# Patient Record
Sex: FEMALE | Race: WHITE | NOT HISPANIC OR LATINO | Employment: FULL TIME | URBAN - METROPOLITAN AREA
[De-identification: names, ages, dates, MRNs, and addresses within clinical notes are randomized per-mention and may not be internally consistent; named-entity substitution may affect disease eponyms.]

---

## 2021-02-10 ENCOUNTER — TELEPHONE (OUTPATIENT)
Dept: URGENT CARE | Facility: CLINIC | Age: 21
End: 2021-02-10

## 2021-03-01 ENCOUNTER — OFFICE VISIT (OUTPATIENT)
Dept: ORTHOPEDIC SURGERY | Facility: CLINIC | Age: 21
End: 2021-03-01

## 2021-03-01 VITALS — BODY MASS INDEX: 24.17 KG/M2 | WEIGHT: 145.06 LBS | HEIGHT: 65 IN

## 2021-03-01 DIAGNOSIS — M25.552 BILATERAL HIP PAIN: Primary | ICD-10-CM

## 2021-03-01 DIAGNOSIS — M25.551 BILATERAL HIP PAIN: Primary | ICD-10-CM

## 2021-03-01 DIAGNOSIS — M54.50 ACUTE MIDLINE LOW BACK PAIN WITHOUT SCIATICA: ICD-10-CM

## 2021-03-01 DIAGNOSIS — M76.31 ILIOTIBIAL BAND SYNDROME OF RIGHT SIDE: ICD-10-CM

## 2021-03-01 DIAGNOSIS — Q65.89 HIP DYSPLASIA, CONGENITAL: ICD-10-CM

## 2021-03-01 PROCEDURE — 99204 OFFICE O/P NEW MOD 45 MIN: CPT | Performed by: ORTHOPAEDIC SURGERY

## 2021-03-01 NOTE — PROGRESS NOTES
"      [facility] Note    Subjective     CC: Pain of the Right Hip and Pain of the Left Hip      HPI    Yenny Liao is a 20 y.o. female who presents with new problem of: bilateral hip pain.  Onset: mechanical fall. The issue has been ongoing for 3 week(s). Pain is a 3/10 on the pain scale. Pain is described as dull. Associated symptoms include pain, popping and stiffness. The pain is worse with leisure; resting and ice improve the pain. Previous treatments have included: NSAIDS.    I have reviewed the following portions of the patient's history:History of Present Illness and review of systems.      She fell 3 weeks ago.  Landed on her tailbone and lateral right hip pain    Review of Systems   Constitutional: Negative.  Negative for chills, fatigue and fever.   HENT: Negative.  Negative for congestion and dental problem.    Eyes: Negative.  Negative for blurred vision.   Respiratory: Negative.  Negative for shortness of breath.    Cardiovascular: Negative.  Negative for leg swelling.   Gastrointestinal: Negative.  Negative for abdominal pain.   Endocrine: Negative.  Negative for polyuria.   Genitourinary: Negative.  Negative for difficulty urinating.   Musculoskeletal: Positive for arthralgias.   Skin: Negative.    Allergic/Immunologic: Negative.    Neurological: Negative.    Hematological: Negative.  Negative for adenopathy.   Psychiatric/Behavioral: Negative.  Negative for behavioral problems.       ROS:    Constiutional:Pt denies fever, chills, nausea, or vomiting.  MSK:as above      Objective      Past Medical History  History reviewed. No pertinent past medical history.      Physical Exam  Ht 165.1 cm (65\")   Wt 65.8 kg (145 lb 1 oz)   LMP 02/01/2021   BMI 24.14 kg/m²     Body mass index is 24.14 kg/m².    Patient is well nourished and well developed.        Ortho Exam  Tender at L5 no radicular pain or symptoms.  Tender right proximal IT band proximal to the greater trochanter   no " pain with hip range of motion    Imaging/Labs/EMG Reviewed:  Imaging Results (Last 24 Hours)     Procedure Component Value Units Date/Time    XR Hips Bilateral With or Without Pelvis 3-4 View [[id number]] Resulted: 03/01/21 0820     Updated: 03/01/21 0828          Assessment:  1. Bilateral hip pain    2. Hip dysplasia, congenital    3. Iliotibial band syndrome of right side    4. Acute midline low back pain without sciatica        Plan:  1. Recommend over the counter anti-inflammatories for pain and/or swelling  2. She bruised her tailbone and right hip soft tissue.  I offered physical therapy but she would like to do exercises on her own.  She will follow-up in 3 weeks.  If not better we will get an MRI looking for bone bruise.  She should do well.  Her hip dysplasia is congenital and she has a family history of arthritis.  It is mild with no intervention needed at this time.  It is actually asymptomatic.  She is free to cancel if she is asymptomatic in 3 weeks.  If she is worse, she may call and let me know and I can get MRI    Follow Up:   Return in about 3 weeks (around 3/22/2021).      Medical Decision Making  Management Options : Moderate - 1 Undiagnosed New Problem with Uncertain Prognosis        Alvaro Bucio M.D., FAAOS  Orthopedic Surgeon  Fellowship Trained Sports Medicine  [facility]  Orthopedics and Sports Medicine  [address], [address]  [place] [address]

## 2021-11-09 ENCOUNTER — LAB (OUTPATIENT)
Dept: LAB | Facility: HOSPITAL | Age: 21
End: 2021-11-09

## 2021-11-09 ENCOUNTER — CONSULT (OUTPATIENT)
Dept: ONCOLOGY | Facility: CLINIC | Age: 21
End: 2021-11-09

## 2021-11-09 VITALS
BODY MASS INDEX: 28.66 KG/M2 | WEIGHT: 172 LBS | SYSTOLIC BLOOD PRESSURE: 128 MMHG | DIASTOLIC BLOOD PRESSURE: 81 MMHG | HEIGHT: 65 IN | OXYGEN SATURATION: 99 % | HEART RATE: 87 BPM | TEMPERATURE: 98.2 F | RESPIRATION RATE: 16 BRPM

## 2021-11-09 DIAGNOSIS — D69.6 THROMBOCYTOPENIA (HCC): ICD-10-CM

## 2021-11-09 DIAGNOSIS — D69.6 THROMBOCYTOPENIA (HCC): Primary | ICD-10-CM

## 2021-11-09 LAB
ALBUMIN SERPL-MCNC: 4.8 G/DL (ref 3.5–5.2)
ALBUMIN/GLOB SERPL: 1.7 G/DL
ALP SERPL-CCNC: 86 U/L (ref 39–117)
ALT SERPL W P-5'-P-CCNC: 27 U/L (ref 1–33)
ANION GAP SERPL CALCULATED.3IONS-SCNC: 9 MMOL/L (ref 5–15)
AST SERPL-CCNC: 18 U/L (ref 1–32)
BASOPHILS # BLD AUTO: 0.04 10*3/MM3 (ref 0–0.2)
BASOPHILS NFR BLD AUTO: 0.4 % (ref 0–1.5)
BILIRUB SERPL-MCNC: 0.3 MG/DL (ref 0–1.2)
BUN SERPL-MCNC: 12 MG/DL (ref 6–20)
BUN/CREAT SERPL: 13.2 (ref 7–25)
CALCIUM SPEC-SCNC: 9.4 MG/DL (ref 8.6–10.5)
CHLORIDE SERPL-SCNC: 104 MMOL/L (ref 98–107)
CO2 SERPL-SCNC: 27 MMOL/L (ref 22–29)
CREAT SERPL-MCNC: 0.91 MG/DL (ref 0.57–1)
DEPRECATED RDW RBC AUTO: 40 FL (ref 37–54)
EOSINOPHIL # BLD AUTO: 0.14 10*3/MM3 (ref 0–0.4)
EOSINOPHIL NFR BLD AUTO: 1.3 % (ref 0.3–6.2)
ERYTHROCYTE [DISTWIDTH] IN BLOOD BY AUTOMATED COUNT: 11.8 % (ref 12.3–15.4)
FERRITIN SERPL-MCNC: 73.62 NG/ML (ref 13–150)
GFR SERPL CREATININE-BSD FRML MDRD: 77 ML/MIN/1.73
GLOBULIN UR ELPH-MCNC: 2.8 GM/DL
GLUCOSE SERPL-MCNC: 97 MG/DL (ref 65–99)
HCT VFR BLD AUTO: 41.5 % (ref 34–46.6)
HGB BLD-MCNC: 14.4 G/DL (ref 12–15.9)
IMM GRANULOCYTES # BLD AUTO: 0.05 10*3/MM3 (ref 0–0.05)
IMM GRANULOCYTES NFR BLD AUTO: 0.4 % (ref 0–0.5)
IRON 24H UR-MRATE: 74 MCG/DL (ref 37–145)
IRON SATN MFR SERPL: 20 % (ref 20–50)
LDH SERPL-CCNC: 237 U/L (ref 135–214)
LYMPHOCYTES # BLD AUTO: 3.31 10*3/MM3 (ref 0.7–3.1)
LYMPHOCYTES NFR BLD AUTO: 29.7 % (ref 19.6–45.3)
MCH RBC QN AUTO: 32.1 PG (ref 26.6–33)
MCHC RBC AUTO-ENTMCNC: 34.7 G/DL (ref 31.5–35.7)
MCV RBC AUTO: 92.6 FL (ref 79–97)
MONOCYTES # BLD AUTO: 0.75 10*3/MM3 (ref 0.1–0.9)
MONOCYTES NFR BLD AUTO: 6.7 % (ref 5–12)
NEUTROPHILS NFR BLD AUTO: 6.87 10*3/MM3 (ref 1.7–7)
NEUTROPHILS NFR BLD AUTO: 61.5 % (ref 42.7–76)
NRBC BLD AUTO-RTO: 0 /100 WBC (ref 0–0.2)
PLATELET # BLD AUTO: 159 10*3/MM3 (ref 140–450)
PMV BLD AUTO: 9.4 FL (ref 6–12)
POTASSIUM SERPL-SCNC: 4.4 MMOL/L (ref 3.5–5.2)
PROT SERPL-MCNC: 7.6 G/DL (ref 6–8.5)
RBC # BLD AUTO: 4.48 10*6/MM3 (ref 3.77–5.28)
SODIUM SERPL-SCNC: 140 MMOL/L (ref 136–145)
TIBC SERPL-MCNC: 367 MCG/DL (ref 298–536)
TRANSFERRIN SERPL-MCNC: 246 MG/DL (ref 200–360)
WBC # BLD AUTO: 11.16 10*3/MM3 (ref 3.4–10.8)

## 2021-11-09 PROCEDURE — 80053 COMPREHEN METABOLIC PANEL: CPT

## 2021-11-09 PROCEDURE — 82728 ASSAY OF FERRITIN: CPT

## 2021-11-09 PROCEDURE — 86038 ANTINUCLEAR ANTIBODIES: CPT

## 2021-11-09 PROCEDURE — 84466 ASSAY OF TRANSFERRIN: CPT

## 2021-11-09 PROCEDURE — 86431 RHEUMATOID FACTOR QUANT: CPT

## 2021-11-09 PROCEDURE — 83010 ASSAY OF HAPTOGLOBIN QUANT: CPT

## 2021-11-09 PROCEDURE — 36415 COLL VENOUS BLD VENIPUNCTURE: CPT

## 2021-11-09 PROCEDURE — 85060 BLOOD SMEAR INTERPRETATION: CPT

## 2021-11-09 PROCEDURE — 84630 ASSAY OF ZINC: CPT

## 2021-11-09 PROCEDURE — 83540 ASSAY OF IRON: CPT

## 2021-11-09 PROCEDURE — 85025 COMPLETE CBC W/AUTO DIFF WBC: CPT

## 2021-11-09 PROCEDURE — 99204 OFFICE O/P NEW MOD 45 MIN: CPT | Performed by: INTERNAL MEDICINE

## 2021-11-09 PROCEDURE — 83615 LACTATE (LD) (LDH) ENZYME: CPT

## 2021-11-09 NOTE — PROGRESS NOTES
New Patient Office Visit      Date: 2021     Patient Name: Yenny Liao  MRN: [id number]  : 2000  Referring Physician: Hannah Cox    Chief Complaint: Establish care for thrombocytopenia    History of Present Illness: Yenny Liao is a pleasant 21 y.o. female with no significant past medical history who presents today for evaluation of cytopenia. The patient states she was initially seen by her PCP for evaluation of fatigue.  She had a CBC checked which was notable for mild thrombocytopenia to 138K in 2021.  Repeat platelet count in 2021 was 128K.  WBC and hemoglobin have within normal limits.  Previously checked iron studies were within normal limits outside of a mildly decreased ferritin level.  Vitamin B12, folate, and vitamin D were within normal limits.  She does note some mild easy bruising but denies any easy bleeding episodes.  Denies any family history of low platelet count or blood related cancers.  She otherwise feels well and has no other major complaints    Oncology History:    Oncology/Hematology History    No history exists.       Subjective      Review of Systems:     Constitutional: Negative for fevers, chills, or weight loss  Eyes: Negative for blurred vision or discharge         Ear/Nose/Throat: Negative for difficulty swallowing, sore throat, LAD                                                       Respiratory: Negative for cough, SOA, wheezing                                                                                        Cardiovascular: Negative for chest pain or palpitations                                                                  Gastrointestinal: Negative for nausea, vomiting or diarrhea                                                                     Genitourinary: Negative for dysuria or hematuria                                                                                           Musculoskeletal: Negative for any joint pains  "or muscle aches                                                                        Neurologic: Negative for any weakness, headaches, dizziness                                                                         Hematologic: Negative for any easy bleeding or bruising                                                                                   Psychiatric: Negative for anxiety or depression                             Past Medical History: History reviewed. No pertinent past medical history.    Past Surgical History:   Past Surgical History:   Procedure Laterality Date   • DENTAL PROCEDURE         Family History:   Family History   Problem Relation Age of Onset   • No Known Problems Mother    • Stroke Father    • Thyroid cancer Maternal Grandmother        Social History:   Social History     Socioeconomic History   • Marital status: Single   Tobacco Use   • Smoking status: Never Smoker   • Smokeless tobacco: Never Used   Substance and Sexual Activity   • Alcohol use: Never   • Drug use: Never       Medications:     Current Outpatient Medications:   •  VITAMIN D PO, Take  by mouth., Disp: , Rfl:     Allergies:   Allergies   Allergen Reactions   • Latex Rash   • Sulfa Antibiotics Rash       Objective     Physical Exam:  Vital Signs:   Vitals:    11/09/21 1520   BP: 128/81   Pulse: 87   Resp: 16   Temp: 98.2 °F (36.8 °C)   TempSrc: Temporal   SpO2: 99%   Weight: 78 kg (172 lb)   Height: 165.1 cm (65\")   PainSc: 0-No pain     Pain Score    11/09/21 1520   PainSc: 0-No pain     ECOG Performance Status: 0 - Asymptomatic    Constitutional: NAD, ECOG 0  Eyes: PERRLA, scleral anicteric  ENT: No LAD, no thyromegaly  Respiratory: CTAB, no wheezing, rales, rhonchi  Cardiovascular: RRR, no murmurs, pulses 2+ bilaterally  Abdomen: soft, NT/ND, no HSM  Musculoskeletal: strength 5/5 bilaterally, no c/c/e  Neurologic: A&O x 3, CN II-XII intact grossly  Psych: mood and affect congruent, no SI or HI    Results Review:   No " visits with results within 2 Week(s) from this visit.   Latest known visit with results is:   No results found for any previous visit.       No results found.    Assessment / Plan      Assessment/Plan:   1. Thrombocytopenia (HCC) (Primary)  -Noted on outside records with platelet count ranging between 128K and 138K since July 2021  -Previously checked iron studies, vitamin B12, folate, vitamin D levels within normal limits  -We will check labs as below to rule other secondary causes  -No indication for bone marrow biopsy at this time  -     Peripheral Blood Smear; Future  -     CBC & Differential; Future  -     Comprehensive Metabolic Panel; Future  -     Lactate Dehydrogenase; Future  -     Haptoglobin; Future  -     Rheumatoid Factor; Future  -     Nuclear Antigen Antibody, IFA; Future  -     Zinc; Future  -     Ferritin; Future  -     Iron Profile; Future           Follow Up:   Follow-up in 3 weeks    Kwadwo Hollingsworth MD  Hematology and Oncology     Please note that portions of this note may have been completed with a voice recognition program. Efforts were made to edit the dictations, but occasionally words are mistranscribed.

## 2021-11-10 LAB
ANA TITR SER IF: NEGATIVE {TITER}
CHROMATIN AB SERPL-ACNC: <10 IU/ML (ref 0–14)
CYTOLOGIST CVX/VAG CYTO: NORMAL
HAPTOGLOB SERPL-MCNC: 144 MG/DL (ref 30–200)
PATH INTERP BLD-IMP: NORMAL

## 2021-11-12 LAB — ZINC SERPL-MCNC: 71 UG/DL (ref 44–115)

## 2021-12-03 ENCOUNTER — OFFICE VISIT (OUTPATIENT)
Dept: ONCOLOGY | Facility: CLINIC | Age: 21
End: 2021-12-03

## 2021-12-03 VITALS
WEIGHT: 174 LBS | DIASTOLIC BLOOD PRESSURE: 67 MMHG | RESPIRATION RATE: 16 BRPM | HEIGHT: 65 IN | TEMPERATURE: 97.5 F | HEART RATE: 65 BPM | BODY MASS INDEX: 28.99 KG/M2 | OXYGEN SATURATION: 98 % | SYSTOLIC BLOOD PRESSURE: 131 MMHG

## 2021-12-03 DIAGNOSIS — D69.6 THROMBOCYTOPENIA (HCC): Primary | ICD-10-CM

## 2021-12-03 PROCEDURE — 99214 OFFICE O/P EST MOD 30 MIN: CPT | Performed by: INTERNAL MEDICINE

## 2021-12-03 NOTE — PROGRESS NOTES
Follow Up Office Visit      Date: 2021     Patient Name: Yenny Liao  MRN: [id number]  : 2000  Referring Physician: Hannah Cox     Chief Complaint:  Follow-up for thrombocytopenia     History of Present Illness: Yenny Liao is a pleasant 21 y.o. female with no significant past medical history who presents today for evaluation of cytopenia. The patient states she was initially seen by her PCP for evaluation of fatigue.  She had a CBC checked which was notable for mild thrombocytopenia to 138K in 2021.  Repeat platelet count in 2021 was 128K.  WBC and hemoglobin have within normal limits.  Previously checked iron studies were within normal limits outside of a mildly decreased ferritin level.  Vitamin B12, folate, and vitamin D were within normal limits.  She does note some mild easy bruising but denies any easy bleeding episodes.  Denies any family history of low platelet count or blood related cancers.  She otherwise feels well and has no other major complaints    Interval History:  Presents to clinic for follow-up.  Overall continues to do well.  Denies any easy bleeding or bruising episodes.  Denies any weight loss, fatigue, night sweats, chest pain, palpitations    Oncology History:    Oncology/Hematology History    No history exists.       Subjective      Review of Systems:   Constitutional: Negative for fevers, chills, or weight loss  Eyes: Negative for blurred vision or discharge         Ear/Nose/Throat: Negative for difficulty swallowing, sore throat, LAD                                                       Respiratory: Negative for cough, SOA, wheezing                                                                                        Cardiovascular: Negative for chest pain or palpitations                                                                  Gastrointestinal: Negative for nausea, vomiting or diarrhea                                                     "                 Genitourinary: Negative for dysuria or hematuria                                                                                           Musculoskeletal: Negative for any joint pains or muscle aches                                                                        Neurologic: Negative for any weakness, headaches, dizziness                                                                         Hematologic: Negative for any easy bleeding or bruising                                                                                   Psychiatric: Negative for anxiety or depression                          Past Medical History/Past Surgical History/ Family History/ Social History: Reviewed by me and unchanged from my previous documentation done on November 2021.     Medications:     Current Outpatient Medications:   •  VITAMIN D PO, Take  by mouth., Disp: , Rfl:     Allergies:   Allergies   Allergen Reactions   • Latex Rash   • Sulfa Antibiotics Rash       Objective     Physical Exam:  Vital Signs:   Vitals:    12/03/21 1527   BP: 131/67   Pulse: 65   Resp: 16   Temp: 97.5 °F (36.4 °C)   TempSrc: Temporal   SpO2: 98%   Weight: 78.9 kg (174 lb)   Height: 165.1 cm (65\")   PainSc: 0-No pain     Pain Score    12/03/21 1527   PainSc: 0-No pain     ECOG Performance Status: 0 - Asymptomatic    Constitutional: NAD, ECOG 0  Eyes: PERRLA, scleral anicteric  ENT: No LAD, no thyromegaly  Respiratory: CTAB, no wheezing, rales, rhonchi  Cardiovascular: RRR, no murmurs, pulses 2+ bilaterally  Abdomen: soft, NT/ND, no HSM  Musculoskeletal: strength 5/5 bilaterally, no c/c/e  Neurologic: A&O x 3, CN II-XII intact grossly    Results Review:   No visits with results within 2 Week(s) from this visit.   Latest known visit with results is:   Lab on 11/09/2021   Component Date Value Ref Range Status   • Performed by: 11/09/2021 Dr. Maria D Graff   Final   • Pathologist Interpretation 11/09/2021    Final                  "   Value:Interpretation:  Unremarkable blood smear  See comment    Comment: Red blood cell, white blood cell and platelet populations are normal in total number and morphology.  Rare, small platelet clumps (3-5 platelets per clump) noted; indeterminant for effect on total platelet count.   • Glucose 11/09/2021 97  65 - 99 mg/dL Final   • BUN 11/09/2021 12  6 - 20 mg/dL Final   • Creatinine 11/09/2021 0.91  0.57 - 1.00 mg/dL Final   • Sodium 11/09/2021 140  136 - 145 mmol/L Final   • Potassium 11/09/2021 4.4  3.5 - 5.2 mmol/L Final    Slight hemolysis detected by analyzer. Results may be affected.   • Chloride 11/09/2021 104  98 - 107 mmol/L Final   • CO2 11/09/2021 27.0  22.0 - 29.0 mmol/L Final   • Calcium 11/09/2021 9.4  8.6 - 10.5 mg/dL Final   • Total Protein 11/09/2021 7.6  6.0 - 8.5 g/dL Final   • Albumin 11/09/2021 4.80  3.50 - 5.20 g/dL Final   • ALT (SGPT) 11/09/2021 27  1 - 33 U/L Final   • AST (SGOT) 11/09/2021 18  1 - 32 U/L Final   • Alkaline Phosphatase 11/09/2021 86  39 - 117 U/L Final   • Total Bilirubin 11/09/2021 0.3  0.0 - 1.2 mg/dL Final   • eGFR Non  Amer 11/09/2021 77  >60 mL/min/1.73 Final   • Globulin 11/09/2021 2.8  gm/dL Final    Calculated Result   • A/G Ratio 11/09/2021 1.7  g/dL Final   • BUN/Creatinine Ratio 11/09/2021 13.2  7.0 - 25.0 Final   • Anion Gap 11/09/2021 9.0  5.0 - 15.0 mmol/L Final   • LDH 11/09/2021 237* 135 - 214 U/L Final   • Haptoglobin 11/09/2021 144  30 - 200 mg/dL Final    Specimen hemolyzed. Results may be affected.   • Rheumatoid Factor Quantitative 11/09/2021 <10.0  0.0 - 14.0 IU/mL Final   • KAREEM 11/09/2021 Negative   Final                                         Negative   <1:80                                       Borderline  1:80                                       Positive   >1:80  ICAP nomenclature: AC-0  For more information about Hep-2 cell patterns use  ANApatterns.org, the official website for the International  Consensus on Antinuclear Antibody  (KAREEM) Patterns (ICAP).   • Zinc 11/09/2021 71  44 - 115 ug/dL Final                                    Detection Limit = 5   • Ferritin 11/09/2021 73.62  13.00 - 150.00 ng/mL Final   • Iron 11/09/2021 74  37 - 145 mcg/dL Final   • Iron Saturation 11/09/2021 20  20 - 50 % Final   • Transferrin 11/09/2021 246  200 - 360 mg/dL Final   • TIBC 11/09/2021 367  298 - 536 mcg/dL Final   • WBC 11/09/2021 11.16* 3.40 - 10.80 10*3/mm3 Final   • RBC 11/09/2021 4.48  3.77 - 5.28 10*6/mm3 Final   • Hemoglobin 11/09/2021 14.4  12.0 - 15.9 g/dL Final   • Hematocrit 11/09/2021 41.5  34.0 - 46.6 % Final   • MCV 11/09/2021 92.6  79.0 - 97.0 fL Final   • MCH 11/09/2021 32.1  26.6 - 33.0 pg Final   • MCHC 11/09/2021 34.7  31.5 - 35.7 g/dL Final   • RDW 11/09/2021 11.8* 12.3 - 15.4 % Final   • RDW-SD 11/09/2021 40.0  37.0 - 54.0 fl Final   • MPV 11/09/2021 9.4  6.0 - 12.0 fL Final   • Platelets 11/09/2021 159  140 - 450 10*3/mm3 Final   • Neutrophil % 11/09/2021 61.5  42.7 - 76.0 % Final   • Lymphocyte % 11/09/2021 29.7  19.6 - 45.3 % Final   • Monocyte % 11/09/2021 6.7  5.0 - 12.0 % Final   • Eosinophil % 11/09/2021 1.3  0.3 - 6.2 % Final   • Basophil % 11/09/2021 0.4  0.0 - 1.5 % Final   • Immature Grans % 11/09/2021 0.4  0.0 - 0.5 % Final   • Neutrophils, Absolute 11/09/2021 6.87  1.70 - 7.00 10*3/mm3 Final   • Lymphocytes, Absolute 11/09/2021 3.31* 0.70 - 3.10 10*3/mm3 Final   • Monocytes, Absolute 11/09/2021 0.75  0.10 - 0.90 10*3/mm3 Final   • Eosinophils, Absolute 11/09/2021 0.14  0.00 - 0.40 10*3/mm3 Final   • Basophils, Absolute 11/09/2021 0.04  0.00 - 0.20 10*3/mm3 Final   • Immature Grans, Absolute 11/09/2021 0.05  0.00 - 0.05 10*3/mm3 Final   • nRBC 11/09/2021 0.0  0.0 - 0.2 /100 WBC Final       No results found.    Assessment / Plan      Assessment/Plan:   1. Thrombocytopenia (HCC) (Primary)  -Noted on outside records with platelet count ranging between 128K and 138K since July 2021  -Previously checked iron studies, vitamin  B12, folate, vitamin D levels within normal limits  -Repeat platelet count 159K in November 2021  -Iron studies, zinc, haptoglobin within normal limits  -LDH mildly elevated  -Peripheral smear without evidence of schistocytes and only notable for some mild platelet clumping  -At this time, she likely has either an early ITP vs possible pseudothrombocytopenia  -No indication for bone marrow biopsy at this time  -Advised patient that she does not require any further hematologic work-up at this time.   -Should her platelet count drop below 50K, will consider further work-up at that time         Follow Up:   Follow-up as needed    Kwadwo Hollingsworth MD  Hematology and Oncology     Please note that portions of this note may have been completed with a voice recognition program. Efforts were made to edit the dictations, but occasionally words are mistranscribed.